# Patient Record
Sex: MALE | Race: BLACK OR AFRICAN AMERICAN | NOT HISPANIC OR LATINO | URBAN - METROPOLITAN AREA
[De-identification: names, ages, dates, MRNs, and addresses within clinical notes are randomized per-mention and may not be internally consistent; named-entity substitution may affect disease eponyms.]

---

## 2020-06-16 ENCOUNTER — TELEPHONE (OUTPATIENT)
Dept: SCHEDULING | Facility: IMAGING CENTER | Age: 40
End: 2020-06-16

## 2020-06-17 ENCOUNTER — OFFICE VISIT (OUTPATIENT)
Dept: MEDICAL GROUP | Facility: MEDICAL CENTER | Age: 40
End: 2020-06-17
Payer: COMMERCIAL

## 2020-06-17 VITALS
WEIGHT: 201 LBS | SYSTOLIC BLOOD PRESSURE: 118 MMHG | HEIGHT: 69 IN | DIASTOLIC BLOOD PRESSURE: 72 MMHG | TEMPERATURE: 97.4 F | HEART RATE: 77 BPM | BODY MASS INDEX: 29.77 KG/M2 | OXYGEN SATURATION: 97 %

## 2020-06-17 DIAGNOSIS — M25.561 CHRONIC PAIN OF BOTH KNEES: ICD-10-CM

## 2020-06-17 DIAGNOSIS — Z00.00 ROUTINE GENERAL MEDICAL EXAMINATION AT A HEALTH CARE FACILITY: ICD-10-CM

## 2020-06-17 DIAGNOSIS — G89.29 CHRONIC PAIN OF BOTH KNEES: ICD-10-CM

## 2020-06-17 DIAGNOSIS — M25.562 CHRONIC PAIN OF BOTH KNEES: ICD-10-CM

## 2020-06-17 PROCEDURE — 99385 PREV VISIT NEW AGE 18-39: CPT | Performed by: INTERNAL MEDICINE

## 2020-06-17 SDOH — HEALTH STABILITY: MENTAL HEALTH: HOW MANY STANDARD DRINKS CONTAINING ALCOHOL DO YOU HAVE ON A TYPICAL DAY?: 1 OR 2

## 2020-06-17 SDOH — HEALTH STABILITY: MENTAL HEALTH: HOW OFTEN DO YOU HAVE A DRINK CONTAINING ALCOHOL?: 2-3 TIMES A WEEK

## 2020-06-17 ASSESSMENT — PATIENT HEALTH QUESTIONNAIRE - PHQ9: CLINICAL INTERPRETATION OF PHQ2 SCORE: 0

## 2020-06-17 NOTE — ASSESSMENT & PLAN NOTE
He reports that when he jogs for a long distance that he gets some pain in both knees.  He changes his running shoes every 6 to 800 miles.

## 2020-06-17 NOTE — PROGRESS NOTES
Chief Complaint:     Naren Johnson is a 40 y.o. male who presents for a general examAnd to establish himself in this office.    Routine general medical examination at a health care facility  Patient is seen today for general health evaluation and to establish himself in this office.  He has no specific complaints other than some mild knee pain.    Chronic pain of both knees  He reports that when he jogs for a long distance that he gets some pain in both knees.  He changes his running shoes every 6 to 800 miles.      Last colonoscopy:Not yet.   Hx STDs: no  Regular exercise: yes     He  has a past medical history of Routine general medical examination at a health care facility (6/17/2020).  He  has no past surgical history on file.  Family History   Problem Relation Age of Onset   • No Known Problems Mother    • Cancer Father 79        pancreatic cancer     Social History     Tobacco Use   • Smoking status: Never Smoker   • Smokeless tobacco: Never Used   Substance Use Topics   • Alcohol use: Yes     Alcohol/week: 1.2 oz     Types: 2 Cans of beer per week     Frequency: 2-3 times a week     Drinks per session: 1 or 2     Comment: Occ.    • Drug use: Not on file       No current outpatient medications on file.     No current facility-administered medications for this visit.     (including changes today)  Allergies: Patient has no known allergies.    ROS:   General:  no recent change in strength, weight, appetite, or exercise tolerance.  CNS: no significant lightheadedness, headaches, fainting spells, seizures, or change in mentation.  EENT: no significant change in vision, hearing, sense of smell, swallowing, or voice.  RESP: no significant wheezing, coughing, or dyspnea.  CV: no significant palpitations or chest pain.  G.I.: no significant indigestion, abdominal pain, or change in bowel habits.  : no significant change in urinating, no dysuria or hematuria, or change in sexual function, or change in  "testes.  EXTREM:  no significant edema, swelling, pain, or limitations of motion.  INTEG: no significant change in skin, hair or fingernails.  LYMPH: no significant adenopathy or other masses.  PSYCH: no significant anxiety or depression.        PHYSICAL EXAMINATION:  Body mass index is 29.68 kg/m².  Wt Readings from Last 4 Encounters:   06/17/20 91.2 kg (201 lb)     PE:  /72 (BP Location: Left arm, Patient Position: Sitting)   Pulse 77   Temp 36.3 °C (97.4 °F)   Ht 1.753 m (5' 9\")   Wt 91.2 kg (201 lb)   SpO2 97%   BMI 29.68 kg/m²     GEN: clean, well groomed, in no acute distress, alert.  EENT: PERRL, normal EOMs, fundi unremarkable, normal external auditory canals and tympanic membranes, pharynx unremarkable, dentition satisfactory, nose unremarkable, neck supple and without significant lymphadenopathy or masses, trachea midline, thyroid unremarkable.  LUNGS: bilateral breath sounds, without significant wheezes or rales or abnormalities in percussion.  CV:  peripheral circulation is satisfactory, pedal pulses are satisfactory, heart sounds are unremarkable.  ABD: soft, without significant masses, no hepatosplenomegaly, no tenderness, bowel sounds are normal, no significant inguinal adenopathy.  : normal external genitalia, without urethral discharge, testes without significant masses, rectal exam Was not performed at this time.   EXTREM:  without significant edema, cyanosis or deformity.  LYMPH:  no significant lymphadenopathy or lymphedema.  INTEG:  skin, hair, fingernails are unremarkable without significant rashes or lesions  NEURO:  essentially normal sensation, strength, gate, and cerebellar function, normal DTRs affect is normal, oriented times three.  PSYCH: appropriate affect and mood.        ASSESSMENT/PLAN:  1. Routine general medical examination at a health care facility      He seems in good health.  He was encouraged to return for periodic health evaluation in 1 year if not sooner.   2. " Chronic pain of both knees      He will change jogging shoes every 3 to 400 miles.  He will report any significant exacerbation of knee discomfort.     Labs per orders  Counseling about diet, exercise, skin care  Vaccinations per orders  HM: He will check on the last time he had a Tdap vaccination.  Next office visit for recheck of chronic medical conditions is due in  1 year

## 2020-06-17 NOTE — ASSESSMENT & PLAN NOTE
Patient is seen today for general health evaluation and to establish himself in this office.  He has no specific complaints other than some mild knee pain.

## 2021-04-21 ENCOUNTER — IMMUNIZATION (OUTPATIENT)
Dept: FAMILY PLANNING/WOMEN'S HEALTH CLINIC | Facility: IMMUNIZATION CENTER | Age: 41
End: 2021-04-21
Payer: COMMERCIAL

## 2021-04-21 DIAGNOSIS — Z23 ENCOUNTER FOR VACCINATION: Primary | ICD-10-CM

## 2021-04-21 PROCEDURE — 91300 PFIZER SARS-COV-2 VACCINE: CPT

## 2021-04-21 PROCEDURE — 0001A PFIZER SARS-COV-2 VACCINE: CPT

## 2021-05-14 ENCOUNTER — IMMUNIZATION (OUTPATIENT)
Dept: FAMILY PLANNING/WOMEN'S HEALTH CLINIC | Facility: IMMUNIZATION CENTER | Age: 41
End: 2021-05-14
Payer: COMMERCIAL

## 2021-05-14 DIAGNOSIS — Z23 ENCOUNTER FOR VACCINATION: Primary | ICD-10-CM

## 2021-05-14 PROCEDURE — 0002A PFIZER SARS-COV-2 VACCINE: CPT

## 2021-05-14 PROCEDURE — 91300 PFIZER SARS-COV-2 VACCINE: CPT
